# Patient Record
Sex: FEMALE | Race: WHITE | URBAN - METROPOLITAN AREA
[De-identification: names, ages, dates, MRNs, and addresses within clinical notes are randomized per-mention and may not be internally consistent; named-entity substitution may affect disease eponyms.]

---

## 2021-04-07 ENCOUNTER — IMPORTED ENCOUNTER (OUTPATIENT)
Dept: URBAN - METROPOLITAN AREA CLINIC 50 | Facility: CLINIC | Age: 66
End: 2021-04-07

## 2021-04-13 ENCOUNTER — IMPORTED ENCOUNTER (OUTPATIENT)
Dept: URBAN - METROPOLITAN AREA CLINIC 50 | Facility: CLINIC | Age: 66
End: 2021-04-13

## 2021-04-17 ASSESSMENT — TONOMETRY
OD_IOP_MMHG: 13
OS_IOP_MMHG: 15

## 2021-04-17 ASSESSMENT — VISUAL ACUITY
OS_CC: 20/200
OD_PH: 20/80
OD_CC: J1
OS_OTHER: 20/200.
OS_CC: J1
OS_PH: 20/100
OD_BAT: 20/200
OD_OTHER: 20/200.
OS_BAT: 20/200
OD_CC: 20/200

## 2021-04-21 ENCOUNTER — PRE-OP - (OUTPATIENT)
Dept: URBAN - METROPOLITAN AREA CLINIC 50 | Facility: CLINIC | Age: 66
End: 2021-04-21

## 2021-04-21 VITALS — DIASTOLIC BLOOD PRESSURE: 90 MMHG | SYSTOLIC BLOOD PRESSURE: 161 MMHG | HEIGHT: 55 IN | HEART RATE: 71 BPM

## 2021-04-21 DIAGNOSIS — H35.372: ICD-10-CM

## 2021-04-21 DIAGNOSIS — H35.361: ICD-10-CM

## 2021-04-21 PROCEDURE — PREOP PRE OP VISIT

## 2021-04-21 PROCEDURE — 92134 CPTRZ OPH DX IMG PST SGM RTA: CPT

## 2021-04-21 ASSESSMENT — VISUAL ACUITY
OS_CC: 20/200-1
OD_CC: 20/200
OD_PH: 20/80-1
OS_PH: 20/80-1

## 2021-04-21 ASSESSMENT — TONOMETRY
OS_IOP_MMHG: 14
OD_IOP_MMHG: 16

## 2021-04-21 NOTE — PATIENT DISCUSSION
CATARACT SURGERY PLANNER - TORIC IOL/+FEMTO:YES Phacoemulsification with IOL: Eye: OS|DOS: 06/85/8528|RDBPF:[id number] |Power: +24.50|Target: PLANO|Femto: YES|Arcs: * @ 30 ; * @ 210|Axis: 30|Visc: DUET|Omidria: YES|10% Phenylephrine: YES|Epi-shugarcaine: YES|Phaco Setting: STANDARD|BSS+: NO|Trypan Blue: NO|CTR: NO|Olive Tip: NO|Atropine: NO|Pupilloplasty: NO|Notes: Patient has Adhesive bandage and Latex allergy. Plan ORA.
LENS OPTION (SUPERIOR): Discussed with patient in detail all available methods and lens options as well as their associated benefits, limitations and out-of-pocket costs. Patient chooses femtosecond laser-assisted cataract surgery with toric monofocal lens implant and understands that reading glasses will still be needed after surgery. The patient also understands that with any IOL there is no guarantee that they will not require glasses to see their best at any distance after surgery. The risks, benefits and alternatives to surgery were explained and all questions were answered.
Smoking cessation discussed.
verbal cues/1 person assist

## 2021-05-11 ENCOUNTER — SAME DAY PO (OUTPATIENT)
Dept: URBAN - METROPOLITAN AREA CLINIC 49 | Facility: CLINIC | Age: 66
End: 2021-05-11

## 2021-05-11 ENCOUNTER — SURGERY/PROCEDURE (OUTPATIENT)
Dept: URBAN - METROPOLITAN AREA SURGERY 16 | Facility: SURGERY | Age: 66
End: 2021-05-11

## 2021-05-11 DIAGNOSIS — H25.12: ICD-10-CM

## 2021-05-11 DIAGNOSIS — Z96.1: ICD-10-CM

## 2021-05-11 DIAGNOSIS — Z98.42: ICD-10-CM

## 2021-05-11 PROCEDURE — 66984 XCAPSL CTRC RMVL W/O ECP: CPT

## 2021-05-11 PROCEDURE — DIUXXFEMTO EYHANCE TORIC IOL WITH FEMTO

## 2021-05-11 PROCEDURE — 99024 POSTOP FOLLOW-UP VISIT: CPT

## 2021-05-11 ASSESSMENT — VISUAL ACUITY: OS_SC: 20/50

## 2021-05-11 ASSESSMENT — TONOMETRY: OS_IOP_MMHG: 23

## 2021-05-19 ENCOUNTER — CATARACT PRE-OP (OUTPATIENT)
Dept: URBAN - METROPOLITAN AREA CLINIC 50 | Facility: CLINIC | Age: 66
End: 2021-05-19

## 2021-05-19 VITALS — SYSTOLIC BLOOD PRESSURE: 176 MMHG | HEART RATE: 80 BPM | HEIGHT: 55 IN | DIASTOLIC BLOOD PRESSURE: 108 MMHG

## 2021-05-19 DIAGNOSIS — Z96.1: ICD-10-CM

## 2021-05-19 DIAGNOSIS — H25.11: ICD-10-CM

## 2021-05-19 DIAGNOSIS — H35.361: ICD-10-CM

## 2021-05-19 DIAGNOSIS — H35.372: ICD-10-CM

## 2021-05-19 DIAGNOSIS — Z98.42: ICD-10-CM

## 2021-05-19 PROCEDURE — 92136 - 2N OPHTHALMIC BIOMETRY BY PARTIAL COHERENCE INTERFEROMETRY WITH INTRAOCULAR LENS POWER CALCULATION

## 2021-05-19 PROCEDURE — PREOP PRE OP VISIT

## 2021-05-19 ASSESSMENT — VISUAL ACUITY
OS_SC: J5@14IN
OD_SC: 20/200-1
OD_PH: 20/80-1
OS_SC: 20/25-

## 2021-05-19 ASSESSMENT — TONOMETRY
OD_IOP_MMHG: 16
OS_IOP_MMHG: 15

## 2021-05-19 NOTE — PATIENT DISCUSSION
CATARACT SURGERY PLANNER - TORIC IOL/+FEMTO: Phacoemulsification with IOL: Eye: OD|DOS: 05/25/2021|Model: [id number]|Power: +24.50|Target: PLANO|Femto: YES|Arcs: - @ 170 ; - @ 350|Axis: 170|Visc: DUET|Omidria: YES|10% Phenylephrine: YE|Epi-shugarcaine: YES|Phaco Setting: STANDARD|BSS+: NO|Trypan Blue: NO|CTR: NO|Olive Tip: NO|Atropine: NO|Pupilloplasty: NO|Notes: PLAN ORA. Latex and Adhesive allergy.

## 2021-05-25 ENCOUNTER — SURGERY/PROCEDURE (OUTPATIENT)
Dept: URBAN - METROPOLITAN AREA SURGERY 16 | Facility: SURGERY | Age: 66
End: 2021-05-25

## 2021-05-25 ENCOUNTER — SAME DAY PO (OUTPATIENT)
Dept: URBAN - METROPOLITAN AREA CLINIC 49 | Facility: CLINIC | Age: 66
End: 2021-05-25

## 2021-05-25 DIAGNOSIS — Z96.1: ICD-10-CM

## 2021-05-25 DIAGNOSIS — H16.223: ICD-10-CM

## 2021-05-25 DIAGNOSIS — H35.361: ICD-10-CM

## 2021-05-25 DIAGNOSIS — H35.372: ICD-10-CM

## 2021-05-25 DIAGNOSIS — Z98.41: ICD-10-CM

## 2021-05-25 DIAGNOSIS — H25.11: ICD-10-CM

## 2021-05-25 PROCEDURE — 66984 XCAPSL CTRC RMVL W/O ECP: CPT

## 2021-05-25 PROCEDURE — 99024 POSTOP FOLLOW-UP VISIT: CPT

## 2021-05-25 PROCEDURE — DIUXXFEMTO EYHANCE TORIC IOL WITH FEMTO

## 2021-05-25 ASSESSMENT — TONOMETRY
OD_IOP_MMHG: 40
OD_IOP_MMHG: 08

## 2021-05-25 ASSESSMENT — VISUAL ACUITY: OD_SC: 20/100+1

## 2021-06-04 ENCOUNTER — 1 WEEK POST-OP (OUTPATIENT)
Dept: URBAN - METROPOLITAN AREA CLINIC 50 | Facility: CLINIC | Age: 66
End: 2021-06-04

## 2021-06-04 DIAGNOSIS — Z96.1: ICD-10-CM

## 2021-06-04 DIAGNOSIS — H16.223: ICD-10-CM

## 2021-06-04 DIAGNOSIS — H35.361: ICD-10-CM

## 2021-06-04 DIAGNOSIS — H35.372: ICD-10-CM

## 2021-06-04 PROCEDURE — 99024 POSTOP FOLLOW-UP VISIT: CPT

## 2021-06-04 ASSESSMENT — TONOMETRY
OS_IOP_MMHG: 14
OD_IOP_MMHG: 14

## 2021-06-04 ASSESSMENT — VISUAL ACUITY
OS_SC: 20/30
OD_SC: 20/30

## 2021-06-24 ENCOUNTER — 4 WEEK POST-OP (OUTPATIENT)
Dept: URBAN - METROPOLITAN AREA CLINIC 50 | Facility: CLINIC | Age: 66
End: 2021-06-24

## 2021-06-24 DIAGNOSIS — H26.491: ICD-10-CM

## 2021-06-24 DIAGNOSIS — H02.831: ICD-10-CM

## 2021-06-24 DIAGNOSIS — H02.834: ICD-10-CM

## 2021-06-24 DIAGNOSIS — H35.372: ICD-10-CM

## 2021-06-24 DIAGNOSIS — H43.812: ICD-10-CM

## 2021-06-24 DIAGNOSIS — H35.361: ICD-10-CM

## 2021-06-24 DIAGNOSIS — Z98.4: ICD-10-CM

## 2021-06-24 PROCEDURE — 99024 POSTOP FOLLOW-UP VISIT: CPT

## 2021-06-24 PROCEDURE — 92015NC REFRACTION NO CHARGE

## 2021-06-24 ASSESSMENT — VISUAL ACUITY
OU_SC: J1
OS_SC: J2
OD_SC: J3
OU_CC: J1+
OS_SC: 20/30-
OS_CC: J1
OD_CC: J1
OD_SC: 20/40

## 2021-06-24 ASSESSMENT — TONOMETRY
OD_IOP_MMHG: 12
OS_IOP_MMHG: 12

## 2021-06-24 NOTE — PATIENT DISCUSSION
Significant Dermatochalsis, OU. Patient is bothered by her droopy eyelids. Wants to wait on testing. When patient is ready we can schedule her for Ptosis VF/photos.

## 2021-09-23 ENCOUNTER — COMPREHENSIVE EXAM (OUTPATIENT)
Dept: URBAN - METROPOLITAN AREA CLINIC 50 | Facility: CLINIC | Age: 66
End: 2021-09-23

## 2021-09-23 VITALS — DIASTOLIC BLOOD PRESSURE: 88 MMHG | HEIGHT: 55 IN | SYSTOLIC BLOOD PRESSURE: 162 MMHG | HEART RATE: 55 BPM

## 2021-09-23 DIAGNOSIS — H26.491: ICD-10-CM

## 2021-09-23 DIAGNOSIS — H16.223: ICD-10-CM

## 2021-09-23 DIAGNOSIS — H35.033: ICD-10-CM

## 2021-09-23 DIAGNOSIS — H43.812: ICD-10-CM

## 2021-09-23 DIAGNOSIS — H35.361: ICD-10-CM

## 2021-09-23 DIAGNOSIS — H35.372: ICD-10-CM

## 2021-09-23 PROCEDURE — 92014 COMPRE OPH EXAM EST PT 1/>: CPT

## 2021-09-23 PROCEDURE — 92134 CPTRZ OPH DX IMG PST SGM RTA: CPT

## 2021-09-23 ASSESSMENT — VISUAL ACUITY
OD_SC: 20/30
OD_SC: J2-
OU_CC: J2-
OD_GLARE: 20/30
OU_SC: J3
OS_GLARE: 20/40
OS_CC: J2-
OS_SC: J2-
OS_GLARE: 20/40
OD_CC: J2-
OD_GLARE: 20/30
OS_SC: 20/25-2

## 2021-09-23 ASSESSMENT — TONOMETRY
OD_IOP_MMHG: 13
OS_IOP_MMHG: 12